# Patient Record
Sex: MALE | Race: WHITE | NOT HISPANIC OR LATINO | Employment: STUDENT | URBAN - METROPOLITAN AREA
[De-identification: names, ages, dates, MRNs, and addresses within clinical notes are randomized per-mention and may not be internally consistent; named-entity substitution may affect disease eponyms.]

---

## 2019-07-30 ENCOUNTER — HOSPITAL ENCOUNTER (OUTPATIENT)
Dept: CARDIOLOGY | Facility: HOSPITAL | Age: 19
Discharge: HOME | End: 2019-07-30

## 2019-07-30 ENCOUNTER — HOSPITAL ENCOUNTER (OUTPATIENT)
Dept: GENERAL RADIOLOGY | Facility: HOSPITAL | Age: 19
Discharge: HOME | End: 2019-07-30
Admitting: SPECIALIST

## 2019-07-30 ENCOUNTER — TRANSCRIBE ORDERS (OUTPATIENT)
Dept: CARDIOLOGY | Facility: HOSPITAL | Age: 19
End: 2019-07-30

## 2019-07-30 DIAGNOSIS — R09.1 PLEURISY: Primary | ICD-10-CM

## 2019-07-30 DIAGNOSIS — R00.2 PALPITATIONS: ICD-10-CM

## 2019-07-30 PROCEDURE — 93005 ELECTROCARDIOGRAM TRACING: CPT

## 2019-07-30 PROCEDURE — 71046 X-RAY EXAM CHEST 2 VIEWS: CPT

## 2019-07-30 PROCEDURE — 93010 ELECTROCARDIOGRAM REPORT: CPT | Performed by: INTERNAL MEDICINE

## 2019-12-18 ENCOUNTER — OFFICE VISIT (OUTPATIENT)
Dept: GASTROENTEROLOGY | Facility: CLINIC | Age: 19
End: 2019-12-18

## 2019-12-18 VITALS
WEIGHT: 195 LBS | DIASTOLIC BLOOD PRESSURE: 68 MMHG | TEMPERATURE: 97.9 F | BODY MASS INDEX: 25.03 KG/M2 | SYSTOLIC BLOOD PRESSURE: 100 MMHG | HEIGHT: 74 IN

## 2019-12-18 DIAGNOSIS — R10.84 GENERALIZED ABDOMINAL PAIN: ICD-10-CM

## 2019-12-18 DIAGNOSIS — R17 ELEVATED BILIRUBIN: Primary | ICD-10-CM

## 2019-12-18 PROCEDURE — 99204 OFFICE O/P NEW MOD 45 MIN: CPT | Performed by: INTERNAL MEDICINE

## 2019-12-18 NOTE — PROGRESS NOTES
Chief Complaint   Patient presents with   • Pain in left side       Subjective     HPI    Walter Campo is a 19 y.o. male with a past medical history noted below who presents for evaluation of abdominal pain and elevated bilirubin. Pain is intermittent, present for years. Located mid abdomen and to the left side.  Seems to not be related to eating  Not keeping him from eating, drinking.  No weight loss, nausea, vomiting, diarrhea or constipation.  He is not really taking anything for it.  He is otherwise young and healthy college student.  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      He reports a longstanding history of elevated bilirubin.  Noticed since childhood.  His mother denies that there was any work-up done.  She is quite worried because she has pancreatic cyst and her father had is a history of pancreatic cancer.  He denies that he has any jaundice, icterus, dark urine nor right upper quadrant pain.  He reports that he had labs with his nephrologist yesterday but I do not currently have records.  Review the chart and Laci indicates that he had labs in September 2016 with a T bili of 1.9, in 2015 it was 2 and 2.2 on 2 different occasions.  I see no breakdown between direct and indirect bilirubin.  I see no ultrasound imaging.    Mother with history of pancreatic cysts, liver hemangioma.  Maternal grandfather with pancreatic cancer.    Hx of appy     No smoking, vaping.    No excess ETOH.    Student at [employer].  Plays on the tennis team.  Works out quite a bit.  Feels healthy.      History reviewed. No pertinent past medical history.    No current outpatient medications on file.    No Known Allergies    Social History     Socioeconomic History   • Marital status: Single     Spouse name: Not on file   • Number of children: Not on file   • Years of education: Not on file   • Highest education level: Not on file   Tobacco Use   • Smoking status: Never Smoker   • Smokeless tobacco: Never Used    Substance and Sexual Activity   • Alcohol use: Yes     Comment: social    • Drug use: Never       Family History   Problem Relation Age of Onset   • Pancreatic cancer Maternal Grandfather        Review of Systems   Constitutional: Negative for activity change, appetite change and fatigue.   HENT: Negative for sore throat and trouble swallowing.    Respiratory: Negative.    Cardiovascular: Negative.    Gastrointestinal: Positive for abdominal pain. Negative for abdominal distention and blood in stool.   Endocrine: Negative for cold intolerance and heat intolerance.   Genitourinary: Negative for difficulty urinating, dysuria and frequency.   Musculoskeletal: Negative for arthralgias, back pain and myalgias.   Skin: Negative.    Hematological: Negative for adenopathy. Does not bruise/bleed easily.   All other systems reviewed and are negative.      Objective     Vitals:    12/18/19 1403   BP: 100/68   Temp: 97.9 °F (36.6 °C)         12/18/19  1403   Weight: 88.5 kg (195 lb)     Body mass index is 25.04 kg/m².    Physical Exam   Constitutional: He is oriented to person, place, and time. He appears well-developed and well-nourished. No distress.   HENT:   Head: Normocephalic and atraumatic.   Right Ear: External ear normal.   Left Ear: External ear normal.   Nose: Nose normal.   Mouth/Throat: Oropharynx is clear and moist.   Eyes: Conjunctivae and EOM are normal. Right eye exhibits no discharge. Left eye exhibits no discharge. No scleral icterus.   Neck: Normal range of motion. Neck supple. No thyromegaly present.   No supraclavicular adenopathy   Cardiovascular: Normal rate, regular rhythm, normal heart sounds and intact distal pulses. Exam reveals no gallop.   No murmur heard.  No lower extremity edema   Pulmonary/Chest: Effort normal and breath sounds normal. No respiratory distress. He has no wheezes.   Abdominal: Soft. Normal appearance and bowel sounds are normal. He exhibits no distension and no mass. There is  no hepatosplenomegaly. There is no tenderness. There is no rigidity, no rebound and no guarding. No hernia.   Genitourinary:   Genitourinary Comments: Rectal exam deferred   Musculoskeletal: Normal range of motion. He exhibits no edema or tenderness.   No atrophy of upper or lower extremities.  Normal digits and nails of both hands.   Lymphadenopathy:     He has no cervical adenopathy.   Neurological: He is alert and oriented to person, place, and time. He displays no atrophy. Coordination normal.   Skin: Skin is warm and dry. No rash noted. He is not diaphoretic. No erythema.   Psychiatric: He has a normal mood and affect. His behavior is normal. Judgment and thought content normal.   Vitals reviewed.      No results found for: WBC, RBC, HGB, HCT, MCV, MCH, MCHC, RDW, RDWSD, MPV, PLT, NEUTRORELPCT, LYMPHORELPCT, MONORELPCT, EOSRELPCT, BASORELPCT, AUTOIGPER, NEUTROABS, LYMPHSABS, MONOSABS, EOSABS, BASOSABS, AUTOIGNUM, NRBC    No results found for: GLUCOSE, NA, K, CO2, CL, ANIONGAP, CREATININE, BUN, BCR, CALCIUM, EGFRIFNONA, ALKPHOS, PROTEINTOT, ALT, AST, BILITOT, ALBUMIN, GLOB, LABIL2      Imaging Results (Last 7 Days)     ** No results found for the last 168 hours. **            No notes on file    Assessment/Plan    Elevated bilirubin: Chronic issue since at least 2015.  I suspect that this is Gilbert's disease given the lack of symptoms and otherwise minimal elevation.    Generalized abdominal pain: Intermittent episodes in an otherwise healthy young man.  Suspected functional/dyspepsia    Plan  We will proceed with ultrasound of the abdomen  Total and direct bilirubin today  Advised Pepcid as needed for abdominal pain issues  Further recommendations following lab work and ultrasound testing    Walter was seen today for pain in left side.    Diagnoses and all orders for this visit:    Elevated bilirubin  -     Bilirubin, Total & Direct  -     US Abdomen Complete; Future    Generalized abdominal pain  -      Bilirubin, Total & Direct  -     US Abdomen Complete; Future        I have discussed the above plan with the patient.  They verbalize understanding and are in agreement with the plan.  They have been advised to contact the office for any questions, concerns, or changes related to their health.    Dictated utilizing Dragon dictation

## 2019-12-19 ENCOUNTER — HOSPITAL ENCOUNTER (OUTPATIENT)
Dept: ULTRASOUND IMAGING | Facility: HOSPITAL | Age: 19
Discharge: HOME | End: 2019-12-19
Admitting: INTERNAL MEDICINE

## 2019-12-19 DIAGNOSIS — R10.84 GENERALIZED ABDOMINAL PAIN: ICD-10-CM

## 2019-12-19 DIAGNOSIS — R17 ELEVATED BILIRUBIN: ICD-10-CM

## 2019-12-19 LAB
BILIRUB DIRECT SERPL-MCNC: 0.3 MG/DL (ref 0.2–0.3)
BILIRUB INDIRECT SERPL-MCNC: 1.2 MG/DL
BILIRUB SERPL-MCNC: 1.5 MG/DL (ref 0.2–1.2)

## 2019-12-19 PROCEDURE — 76700 US EXAM ABDOM COMPLETE: CPT

## 2019-12-21 NOTE — PROGRESS NOTES
His bilirubin is slightly elevated, but the breakdown of the type of bilirubin indicates that this is most consistent with Gilbert's disease, a benign condition.

## 2019-12-23 ENCOUNTER — TELEPHONE (OUTPATIENT)
Dept: GASTROENTEROLOGY | Facility: CLINIC | Age: 19
End: 2019-12-23

## 2019-12-23 NOTE — TELEPHONE ENCOUNTER
----- Message from Jyoti Grewal MD sent at 12/21/2019  4:08 PM EST -----  His bilirubin is slightly elevated, but the breakdown of the type of bilirubin indicates that this is most consistent with Gilbert's disease, a benign condition.

## 2019-12-23 NOTE — PROGRESS NOTES
Please let him and his mother know that the abdominal ultrasound was entirely normal.  The liver looks normal along with the gallbladder.  Both kidneys look normal.  The spleen also looks normal.

## 2019-12-23 NOTE — TELEPHONE ENCOUNTER
Pt returned call. Advised of the note from Dr Grewal. He verb understanding and states she spoke with him at the time of his appt and explained what Gilbert's disease was.

## 2020-01-09 ENCOUNTER — TELEPHONE (OUTPATIENT)
Dept: GASTROENTEROLOGY | Facility: CLINIC | Age: 20
End: 2020-01-09

## 2020-01-10 NOTE — TELEPHONE ENCOUNTER
Called pt's mother and advised per Dr Grewal that gilbert syndrome is a benign condition.  There is nothing that needs to be done for it. Pt verb understanding.

## 2021-01-11 ENCOUNTER — OFFICE VISIT (OUTPATIENT)
Dept: FAMILY MEDICINE CLINIC | Facility: CLINIC | Age: 21
End: 2021-01-11

## 2021-01-11 DIAGNOSIS — Z20.822 CLOSE EXPOSURE TO COVID-19 VIRUS: Primary | ICD-10-CM

## 2021-01-11 PROCEDURE — 99202 OFFICE O/P NEW SF 15 MIN: CPT | Performed by: FAMILY MEDICINE

## 2021-01-13 NOTE — PROGRESS NOTES
Subjective   Walter Campo is a 20 y.o. male.   Consent given    Time 20 min    Visit via telephone    No chief complaint on file.  covid exposure    History of Present Illness   Brother had positive Covid, had close contact, asymptomatic at this time.    The following portions of the patient's history were reviewed and updated as appropriate: allergies, current medications, past family history, past medical history, past social history, past surgical history and problem list.    Past Medical History:   Diagnosis Date   • Gilbert disease        No past surgical history on file.    Family History   Problem Relation Age of Onset   • Pancreatic cancer Maternal Grandfather        Social History     Socioeconomic History   • Marital status: Single     Spouse name: Not on file   • Number of children: Not on file   • Years of education: Not on file   • Highest education level: Not on file   Tobacco Use   • Smoking status: Never Smoker   • Smokeless tobacco: Never Used   Substance and Sexual Activity   • Alcohol use: Yes     Comment: social    • Drug use: Never       No current outpatient medications on file prior to visit.     No current facility-administered medications on file prior to visit.        Review of Systems    No results found for this or any previous visit (from the past 4704 hour(s)).  Objective   There were no vitals filed for this visit.  There is no height or weight on file to calculate BMI.  Physical Exam      Diagnoses and all orders for this visit:    1. Close exposure to COVID-19 virus (Primary)  -     COVID-19,[facility] ROUTINE, NP/OP SWAB IN TRANSPORT MEDIA OR ESWAB 72 HR TAT - Swab, Anterior nasal; Future      Follow up as needed.

## 2021-04-16 ENCOUNTER — BULK ORDERING (OUTPATIENT)
Dept: CASE MANAGEMENT | Facility: OTHER | Age: 21
End: 2021-04-16

## 2021-04-16 DIAGNOSIS — Z23 IMMUNIZATION DUE: ICD-10-CM
